# Patient Record
(demographics unavailable — no encounter records)

---

## 2025-07-07 NOTE — ASSESSMENT
[FreeTextEntry1] : Environmental allergies.  Start Flonase and OTC Nasal saline rinse kit. Consider Allergy testing and SCIT. Seek attention for epistaxis, nasal discharge, facial pain/pressure, fever, chills, headache. Followup 2 months

## 2025-07-07 NOTE — HISTORY OF PRESENT ILLNESS
[de-identified] : 35-year-old male here for nasal congestion and intermittent anterior rhinorrhea, PND for 4 months. Pt noticed it started when he moved to [place] and has had nasal congestion since. Pt reports history of cocaine use, quit 2 years ago, and suspected deviated septum. Denies sinus pain/pressure, anosmia, ageusia, dyspnea. Denies recurrent sinus infection in the past year. Pt will take Zyrtec PRN. Denies CT scan of sinuses.

## 2025-07-07 NOTE — PHYSICAL EXAM
[] : septum deviated to the right [de-identified] : ITH and congestion present [Midline] : trachea located in midline position [Normal] : no neck adenopathy